# Patient Record
Sex: FEMALE | Race: WHITE | NOT HISPANIC OR LATINO | Employment: STUDENT | URBAN - METROPOLITAN AREA
[De-identification: names, ages, dates, MRNs, and addresses within clinical notes are randomized per-mention and may not be internally consistent; named-entity substitution may affect disease eponyms.]

---
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
## 2023-05-03 ENCOUNTER — OFFICE VISIT (OUTPATIENT)
Dept: OBSTETRICS AND GYNECOLOGY | Facility: CLINIC | Age: 18
End: 2023-05-03
Payer: COMMERCIAL

## 2023-05-03 VITALS
RESPIRATION RATE: 18 BRPM | SYSTOLIC BLOOD PRESSURE: 118 MMHG | HEIGHT: 65 IN | BODY MASS INDEX: 33.49 KG/M2 | WEIGHT: 201 LBS | DIASTOLIC BLOOD PRESSURE: 76 MMHG

## 2023-05-03 DIAGNOSIS — Z11.3 SCREENING FOR STD (SEXUALLY TRANSMITTED DISEASE): Primary | ICD-10-CM

## 2023-05-03 DIAGNOSIS — Z30.45 ENCOUNTER FOR SURVEILLANCE OF TRANSDERMAL PATCH HORMONAL CONTRACEPTIVE DEVICE: ICD-10-CM

## 2023-05-03 DIAGNOSIS — Z30.016 ENCOUNTER FOR INITIAL PRESCRIPTION OF TRANSDERMAL PATCH HORMONAL CONTRACEPTIVE DEVICE: ICD-10-CM

## 2023-05-03 RX ORDER — NORELGESTROMIN AND ETHINYL ESTRADIOL 150; 35 UG/D; UG/D
1 PATCH TRANSDERMAL WEEKLY
Qty: 12 PATCH | Refills: 3 | Status: SHIPPED | OUTPATIENT
Start: 2023-05-03

## 2023-05-03 NOTE — PROGRESS NOTES
"        CC:  Check up and Rx refill        Subjective   HPI  Kimmie Franco is a 17 y.o. female, , Patient's last menstrual period was 2023 (exact date). who presents for routine follow up on her birth control refill of Xulane.     With her birth control her periods are regular every 28-30 days, lasting 5 days. Dysmenorrhea:none.  Partner Status: Marital Status: single.  The patient's current method of contraception is contraceptive methods: Xulane patches.  She is satisfied with her current method of birth control. The patient reports additional symptoms as none.      She is sexually active. She has had new partners.. STD testing recommendations have been explained to the patient and she does desire STD testing.    Additional OB/GYN History     OB History        0    Para   0    Term   0       0    AB   0    Living   0       SAB   0    IAB   0    Ectopic   0    Molar   0    Multiple   0    Live Births   0                The additional following portions of the patient's history were reviewed and updated as appropriate: allergies, current medications, past family history, past medical history, past social history, past surgical history and problem list.    Review of Systems    I have reviewed and agree with the HPI, ROS, and historical information as entered above. Julia Richter, APRN    Objective   /76   Resp 18   Ht 165.1 cm (65\")   Wt 91.2 kg (201 lb)   LMP 2023 (Exact Date)   BMI 33.45 kg/m²     Physical Exam  Vitals and nursing note reviewed. Exam conducted with a chaperone present.   Constitutional:       General: She is not in acute distress.     Appearance: Normal appearance. She is not ill-appearing.   Pulmonary:      Effort: Pulmonary effort is normal. No respiratory distress.   Genitourinary:     General: Normal vulva.      Vagina: Normal.      Cervix: Normal.      Uterus: Normal.       Adnexa: Right adnexa normal and left adnexa normal.   Skin:     General: " Skin is warm and dry.   Neurological:      Mental Status: She is alert and oriented to person, place, and time.   Psychiatric:         Mood and Affect: Mood normal.         Behavior: Behavior normal.         Assessment & Plan     Assessment     Problem List Items Addressed This Visit    None  Visit Diagnoses     Screening for STD (sexually transmitted disease)    -  Primary    Relevant Orders    Chlamydia trachomatis, Neisseria gonorrhoeae, Trichomonas vaginalis, PCR - Swab, Cervix    Encounter for surveillance of transdermal patch hormonal contraceptive device        Relevant Medications    norelgestromin-ethinyl estradiol (Xulane) 150-35 MCG/24HR    Encounter for initial prescription of transdermal patch hormonal contraceptive device        Relevant Medications    norelgestromin-ethinyl estradiol (Xulane) 150-35 MCG/24HR          1. Refilled Xulane.    2. Reviewed appropriate exercise, healthy eating habits, importance of self breast exam and breast health, importance of medication compliance  and safe sex  3. Will notify of results      Julia Richter, APRN  05/03/2023

## 2023-05-06 LAB
C TRACH RRNA SPEC QL NAA+PROBE: POSITIVE
N GONORRHOEA RRNA SPEC QL NAA+PROBE: NEGATIVE
T VAGINALIS RRNA SPEC QL NAA+PROBE: NEGATIVE

## 2023-05-08 RX ORDER — DOXYCYCLINE 100 MG/1
100 CAPSULE ORAL 2 TIMES DAILY
Qty: 14 CAPSULE | Refills: 0 | Status: SHIPPED | OUTPATIENT
Start: 2023-05-08 | End: 2023-05-15

## 2023-05-08 RX ORDER — DOXYCYCLINE HYCLATE 100 MG/1
100 CAPSULE ORAL 2 TIMES DAILY
COMMUNITY
Start: 2023-05-08

## 2023-05-08 NOTE — TELEPHONE ENCOUNTER
Patient called requesting her lab results for STD's. Patient is positive for Chlamydia. I have advised patient of this and we will send her in some Doxycycline 100mg tablets to take for 1 week. Patient will need to come back in 4 weeks to be re-tested to make sure the infection has gone away.Patient is to notify all of her sexual partners and advise them she is positive for this and they will need to be treated as well by the PCP or [facility]. Patient is to abstain from sexual activity until she comes back to be re-tested and we have those results. Patient v/u.

## 2024-03-14 ENCOUNTER — OFFICE VISIT (OUTPATIENT)
Dept: FAMILY MEDICINE CLINIC | Facility: CLINIC | Age: 19
End: 2024-03-14
Payer: COMMERCIAL

## 2024-03-14 VITALS
HEIGHT: 65 IN | TEMPERATURE: 98.7 F | WEIGHT: 220 LBS | BODY MASS INDEX: 36.65 KG/M2 | OXYGEN SATURATION: 98 % | HEART RATE: 92 BPM | SYSTOLIC BLOOD PRESSURE: 142 MMHG | DIASTOLIC BLOOD PRESSURE: 80 MMHG

## 2024-03-14 DIAGNOSIS — R05.1 ACUTE COUGH: ICD-10-CM

## 2024-03-14 DIAGNOSIS — J06.9 VIRAL URI: Primary | ICD-10-CM

## 2024-03-14 LAB
EXPIRATION DATE: NORMAL
EXPIRATION DATE: NORMAL
FLUAV AG NPH QL: NEGATIVE
FLUBV AG NPH QL: NEGATIVE
INTERNAL CONTROL: NORMAL
INTERNAL CONTROL: NORMAL
Lab: NORMAL
Lab: NORMAL
SARS-COV-2 AG UPPER RESP QL IA.RAPID: NOT DETECTED

## 2024-03-14 NOTE — PROGRESS NOTES
"Chief Complaint   Patient presents with    Sore Throat     Throat is more irritated than sore, Lymph  nodes  swollen and head congestion for 2 days Headache for 3 days- pressure in temples       Subjective      Kimmie Franco is a 18 y.o. who presents for sore throat and head congestion.  Currently home for Spring Break from [facility].  Knows a couple of people that she works with have had COVID and flu.  Onset two days ago. Has not tried anything OTC     Review of Systems   Constitutional:  Negative for chills and fever.   HENT:  Positive for congestion and sore throat. Negative for ear pain, sinus pressure and sinus pain.    Respiratory:  Positive for cough. Negative for chest tightness and shortness of breath.    Cardiovascular:  Negative for chest pain.   Gastrointestinal:  Negative for abdominal pain, diarrhea, nausea and vomiting.        Objective   Vital Signs:  /80   Pulse 92   Temp 98.7 °F (37.1 °C)   Ht 165.1 cm (65\")   Wt 99.8 kg (220 lb)   SpO2 98%   BMI 36.61 kg/m²     Physical Exam  Vitals and nursing note reviewed.   Constitutional:       Appearance: Normal appearance.   HENT:      Right Ear: Tympanic membrane, ear canal and external ear normal.      Left Ear: Tympanic membrane, ear canal and external ear normal.      Nose: Rhinorrhea present.      Right Sinus: No maxillary sinus tenderness or frontal sinus tenderness.      Left Sinus: No maxillary sinus tenderness or frontal sinus tenderness.      Mouth/Throat:      Pharynx: Posterior oropharyngeal erythema (mild) present.   Cardiovascular:      Rate and Rhythm: Normal rate and regular rhythm.      Heart sounds: Normal heart sounds.   Pulmonary:      Breath sounds: Normal breath sounds.   Neurological:      Mental Status: She is alert.   Psychiatric:         Mood and Affect: Mood normal.         Behavior: Behavior normal.          Result Review                     Assessment and Plan  Diagnoses and all orders for this visit:    1. Viral URI " (Primary)    2. Acute cough  -     POCT SUSAN SARS-CoV-2 Antigen MICHELLE  -     POCT Influenza A/B      OTC Mucinex per package instructions.  Increase oral fluids.          Discussed medications prescribed and OTC medications recommended.  Discussed medication safety, possible side effects and how to take or administer medications. Instructed patient to report any adverse reactions, side effects or concerns.     Reviewed physical exam findings and plan with patient who verbalized understanding and agrees with plan of care. Patient was given opportunity to ask questions and all concerns were addressed prior to the conclusion of today's visit.     Follow Up  No follow-ups on file.  Patient was given instructions and counseling regarding her condition or for health maintenance advice. Please see specific information pulled into the AVS if appropriate.     Note to patient: The 21st Century Cures Act makes medical notes like these available to patients in the interest of transparency. However, be advised this is a medical document. It is intended as peer to peer communication. It is written in medical language and may contain abbreviations or verbiage that are unfamiliar. It may appear blunt or direct. Medical documents are intended to carry relevant information, facts as evident, and the clinical opinion of the provider.

## 2025-07-17 ENCOUNTER — OFFICE VISIT (OUTPATIENT)
Dept: OBSTETRICS AND GYNECOLOGY | Facility: CLINIC | Age: 20
End: 2025-07-17
Payer: COMMERCIAL

## 2025-07-17 VITALS
SYSTOLIC BLOOD PRESSURE: 118 MMHG | WEIGHT: 207 LBS | DIASTOLIC BLOOD PRESSURE: 72 MMHG | BODY MASS INDEX: 34.49 KG/M2 | HEIGHT: 65 IN

## 2025-07-17 DIAGNOSIS — N92.0 MENORRHAGIA WITH REGULAR CYCLE: Primary | ICD-10-CM

## 2025-07-17 DIAGNOSIS — N94.6 DYSMENORRHEA: ICD-10-CM

## 2025-07-17 DIAGNOSIS — Z30.09 ENCOUNTER FOR OTHER GENERAL COUNSELING OR ADVICE ON CONTRACEPTION: ICD-10-CM

## 2025-07-17 DIAGNOSIS — Z30.011 ENCOUNTER FOR INITIAL PRESCRIPTION OF CONTRACEPTIVE PILLS: ICD-10-CM

## 2025-07-17 RX ORDER — DROSPIRENONE AND ESTETROL 3-14.2(28)
1 KIT ORAL DAILY
Qty: 28 TABLET | Refills: 12 | Status: SHIPPED | OUTPATIENT
Start: 2025-07-17

## 2025-07-17 RX ORDER — NAPROXEN SODIUM 550 MG/1
TABLET ORAL
Qty: 60 TABLET | Refills: 1 | Status: SHIPPED | OUTPATIENT
Start: 2025-07-17 | End: 2025-07-17

## 2025-07-17 RX ORDER — NAPROXEN SODIUM 550 MG/1
TABLET ORAL
Qty: 60 TABLET | Refills: 1 | Status: SHIPPED | OUTPATIENT
Start: 2025-07-17

## 2025-07-17 RX ORDER — ESCITALOPRAM OXALATE 10 MG/1
10 TABLET ORAL DAILY
COMMUNITY
Start: 2025-01-28

## 2025-07-17 NOTE — PROGRESS NOTES
Chief Complaint   Patient presents with    Follow-up     periods         Subjective   HPI  Kimmie Franco is a 19 y.o. female, , Patient's last menstrual period was 2025 (exact date).. She presents for initial evaluation of menorrhagia, and bleeding that can last 10-12 days. The menstrual problem began in 2024 when she stopped her Xulane patch.      The patient was seen in the ER @ [facility] on 25 because she thought she broke a rib.  She mentioned her very heavy period and they did STD screening and a pregnancy test which were negative. She knows she is due for an annual but only wants to address the problem at hand.    She will take birth control if it will help her periods.  She has lost over 30# and does not want to gain any.  She has used OCPs, patch, IUD in the past; she declines another IUD.    US was not done today.     Thromboembolic Disease: none  History of hypertension: no  History of migraines: yes with aura  Tobacco Usage?: No     Additional OB/GYN History         Current Outpatient Medications:     escitalopram (LEXAPRO) 10 MG tablet, Take 1 tablet by mouth Daily., Disp: , Rfl:     naproxen sodium (Anaprox DS) 550 MG tablet, Every 12 hours as needed, Disp: 60 tablet, Rfl: 1    cetirizine (zyrTEC) 10 MG tablet, Take 1 tablet by mouth Daily., Disp: 90 tablet, Rfl: 1    Drospirenone-Estetrol (Nextstellis) 3-14.2 MG tablet, Take 1 tablet by mouth Daily. 1 po qd, Disp: 28 tablet, Rfl: 12     Past Medical History:   Diagnosis Date    Anxiety     Migraine with aura     managed by PCP        History reviewed. No pertinent surgical history.      The additional following portions of the patient's history were reviewed and updated as appropriate: allergies and current medications.    Review of Systems   Genitourinary:  Positive for menstrual problem.   All other systems reviewed and are negative.      I have reviewed and agree with the HPI, ROS, and historical information  "as entered above. Julia Galina Richter, APRN      Objective   /72   Ht 165.1 cm (65\")   Wt 93.9 kg (207 lb)   LMP 07/07/2025 (Exact Date)   BMI 34.45 kg/m²     Physical Exam  Vitals and nursing note reviewed.   Constitutional:       General: She is not in acute distress.     Appearance: Normal appearance. She is obese. She is not ill-appearing.   Pulmonary:      Effort: Pulmonary effort is normal. No respiratory distress.   Skin:     General: Skin is warm and dry.   Neurological:      Mental Status: She is alert and oriented to person, place, and time.   Psychiatric:         Mood and Affect: Mood normal.         Behavior: Behavior normal.         Assessment & Plan     Assessment     Problem List Items Addressed This Visit    None  Visit Diagnoses         Menorrhagia with regular cycle    -  Primary    Relevant Medications    Drospirenone-Estetrol (Nextstellis) 3-14.2 MG tablet      Dysmenorrhea        Relevant Medications    Drospirenone-Estetrol (Nextstellis) 3-14.2 MG tablet    naproxen sodium (Anaprox DS) 550 MG tablet      Encounter for initial prescription of contraceptive pills        Relevant Medications    Drospirenone-Estetrol (Nextstellis) 3-14.2 MG tablet      Encounter for other general counseling or advice on contraception                  Plan     D/w pt try Nextstellis and Anaprox DS.  Rev risks, benefits, side effects, and correct use.  RTO 3 mo for annual and sooner if symptoms return or worsen.      Julia Richter, APRN  07/17/2025  "